# Patient Record
Sex: FEMALE | Race: BLACK OR AFRICAN AMERICAN | Employment: FULL TIME | URBAN - METROPOLITAN AREA
[De-identification: names, ages, dates, MRNs, and addresses within clinical notes are randomized per-mention and may not be internally consistent; named-entity substitution may affect disease eponyms.]

---

## 2018-01-17 ENCOUNTER — HOSPITAL ENCOUNTER (OUTPATIENT)
Dept: PHYSICAL THERAPY | Age: 23
Discharge: HOME | End: 2018-01-17
Payer: COMMERCIAL

## 2018-01-17 PROCEDURE — 97162 PT EVAL MOD COMPLEX 30 MIN: CPT

## 2018-01-17 PROCEDURE — 97110 THERAPEUTIC EXERCISES: CPT

## 2018-01-17 PROCEDURE — 97140 MANUAL THERAPY 1/> REGIONS: CPT

## 2018-01-17 PROCEDURE — 97161 PT EVAL LOW COMPLEX 20 MIN: CPT

## 2018-01-17 NOTE — PROGRESS NOTES
PHYSICAL THERAPY - DAILY TREATMENT NOTE    Patient Name: Dallas Amezquita        Date: 2018  : 1995   YES Patient  Verified  Visit #:     Insurance: Payor: Al Barker / Plan: [address] [place] [address] / Product Type: PPO /      In time: 11:34 am Out time: 12:22 pm   Total Treatment Time: 48     Medicare Time Tracking (below)   Total Timed Codes (min):  n/a 1:1 Treatment Time:  n/a     TREATMENT AREA =  Low back pain [M54.5]  Degeneration of lumbar intervertebral disc [M51.36]  SUBJECTIVE  Pain Level (on 0 to 10 scale):   10   Medication Changes/New allergies or changes in medical history, any new surgeries or procedures? NO    If yes, update Summary List   Subjective Functional Status/Changes:  []  No changes reported     See POC         OBJECTIVE  Modalities Rationale:     PD    9 min Therapeutic Exercise:  [x]  See flow sheet   Rationale:      increase ROM and increase strength to improve the patients ability to perform ADLs. 10 min Manual Therapy: Prone STM/DTM to MIKEY L/S erector spinae, sustained pressure to MIKEY QL, sustained pressure to MIKEY piriformis   Rationale:      decrease pain, increase ROM and increase tissue extensibility of L/S to improve patient's ability to tolerate prolonged standing. min Patient Education:  YES  Reviewed HEP   []  Progressed/Changed HEP based on: Other Objective/Functional Measures:   Physical Therapy Evaluation - Lumbar Spine ([facility])    SUBJECTIVE  Aggravated by:   [] Bending [] Sitting [x] Standing [] Walking   [] Moving [] Cough [] Sneeze [] Valsalva   [] AM  [] PM  Lying:  [x] sup   [] pro   [] sidelying   [x] Other: Prepping meals     Eased by:    [x] Bending [] Sitting [] Standing [] Walking   [] Moving [] AM  [] PM  Lying: [] sup  [] pro  [x] sidelying   [] Other: Semireclined     General Health:  Red Flags Indicated? [] Yes    [] No  [] Yes [x] No Recent weight change (If yes, due to dieting?  [] Yes  [] No)   [] Yes [x] No Weakness in legs during walking  [] Yes [x] No Unremitting pain at night  [] Yes [x] No Abdominal pain or problems  [] Yes [x] No Rectal bleeding  [] Yes [x] No Menstrual irregularities  [] Yes [x] No Blood or pain with urination  [] Yes [x] No Dysfunction of bowel or bladder  [] Yes [x] No Numbness/tingling in buttock/genitalia region    Past History/Treatments:     Diagnostic Tests: [] Lab work [x] X-rays    [] CT [] MRI     [] Other:  Results: L/S X-ray: L5 disc degeneration    OBJECTIVE  Posture:  Lateral Shift: [] R    [] L     [] +  [x] -  Kyphosis: [] Increased [] Decreased   [x]  WNL  Lordosis:  [x] Increased [] Decreased   [] WNL    Active Movements: [] N/A   [] Too acute   [] Other:  ROM % AROM % PROM Comments:pain, area   Forward flexion 40-60 85%     Extension 20-30 <25%  Incr LBP   SB right 20-30 100%     SB left 20-30 75%     Rotation right 5-10 100%     Rotation left 5-10 100%       Neuro Screen [] WNL  Myotome/Dermatome/Reflexes: L2-S2 MIKEY LE light touch sensation WNLs  Comments:    Dural Mobility:  SLR Supine: [] R    [] L    [] +    [x] -  @ (degrees):   Slump Test: [] R    [] L    [] +    [x] -  @ (degrees):   Prone Knee Bend: [] R    [] L    [] +    [x] -     Palpation  [] Min  [x] Mod  [] Severe    Location: L/S Paraspinals  [] Min  [x] Mod  [x] Severe    Location: Quadratus Lumborum (L>R)  [] Min  [x] Mod  [] Severe    Location: Piriformis    Strength   L(0-5) R (0-5) N/T   Psoas  (L1,2) 4 4+ []   Quadriceps (L3,4) 5- 4+ []   Ant Tibialis (L4) 4+ 4- []   Extensor Hallicus (L5) 4+ 5-    Gluteus Medius (L5) 3+ 3+ []   Gastrocnemius (S1, S2)   []   Hamstring (S1,2) 5 5 []   Gluteus Sid (S1, S2) 3+ 4- []   Supine Bridge ~50% incr LBP []     Special Tests    [place]:  Distraction: [x] R    [x] L    [x] +    [] -     Compression: [] +    [x] -     Leg Length: [x] +    [] -   Position: R Post     Mobility: Standing flex: R         Hip: Alberta Frannie:  [] R    [x] L    [x] +    [] - Piriformis: [x] R    [x] L    [x] +    [] -          Deficits: Olivia's: [x] R    [x] L    [x] +    [] -     Hamstrings 90/90:R150/L 146    Gastrocsoleus (to neutral): Right: neutral Left: neutral    Justification for Eval Complexity:   Patient History: MEDIUM  Complexity : 1-2 comorbidities / personal factors will impact the outcome/ POC  (Plantar fasciitis and BMI >30)  Examination:HIGH Complexity : 4+ Standardized tests and measures addressing body structure, function, activity limitation and / or participation in recreation  (See POC and musculoskeletal examination attached)  Clinical Presentation: LOW Complexity : Stable, uncomplicated  (pain level 5/10 on average and 10/10 @ worst, intermittent pain)  Clinical Decision Making:MEDIUM Complexity : FOTO score of 26-74 (Foto 58/100)  Overall Complexity:LOW      Post Treatment Pain Level (on 0 to 10) scale:   3  / 10     ASSESSMENT  Assessment/Changes in Function:     See POC     []  See Progress Note/Recertification   Patient will continue to benefit from skilled PT services to modify and progress therapeutic interventions, address functional mobility deficits, address ROM deficits, address strength deficits, analyze and address soft tissue restrictions, analyze and cue movement patterns, analyze and modify body mechanics/ergonomics and assess and modify postural abnormalities to attain remaining goals.    Progress toward goals / Updated goals:    See POC     PLAN  [x]  Upgrade activities as tolerated YES Continue plan of care   []  Discharge due to :    []  Other:      Therapist: Liudmila Price DPT     Date: 1/17/2018 Time: 11:06 AM     Future Appointments  Date Time Provider Richard Garcia   1/23/2018 4:30 PM [address]   1/30/2018 4:00 PM [facility]   2/6/2018 3:30 PM [facility]   2/13/2018 4:00 PM [facility]   2/20/2018 4:00 PM [facility]

## 2018-01-17 NOTE — PROGRESS NOTES
[facility] PHYSICAL THERAPY AT [facility], [address] [facility] [address] - Phone: [phone]  Fax: [phone] / [address] [address] PHYSICAL THERAPY SERVICES  Patient Name: Meggan Palu : 1995   Medical   Diagnosis: Low back pain [M54.5]  Degeneration of lumbar intervertebral disc [M51.36] Treatment Diagnosis: Low back pain  Degeneration of lumbar intervertebral dis   Onset Date: 2017     Referral Source: ARUNA Torres Start of Care [facility]): 2018   Prior Hospitalization: See medical history Provider #: [id number]   Prior Level of Function: Intermittent LBP    Comorbidities: MIKEY Plantar Fasciitis, >30 BMI    Medications: Verified on Patient Summary List   The Plan of Care and following information is based on the information from the initial evaluation.   ==================================================================================  Assessment / key information: Patient is a 20 y.o. female who presents to [facility] with diagnosis of Low back pain [M54.5]  Degeneration of lumbar intervertebral disc [M51.36]. 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 Pain is located in central low back and described as intermittent soreness. Pt denies numbness and tingling radiating down the MIKEY LEs. Pain level is rated at 3/10 at the best, 5/10 currently, and 10/10 at the worst. LBP increases with prolonged standing (1.5 hours) and prepping meals, decreases with massage chair and medication. Upon objective evaluation, patient demonstrates CPA hypermobility and TTP of L4/5, R SI hypomobility, impaired and painful trunk AROM in SB LE, decreased core and multifidus strength, and impaired flexibility of MIKEY piriformis and hamstring musculature.  L/S AROM is as follows: Flexion = 85%, Extension = <25%, R/L Sidebending = 100/75%, R/L Rot = 100%/100%. All L/S special testing and red flags were cleared and negative. Patient scored 62 on FOTO indicating decreased functional status and quality of life. Patient can benefit from skilled PT interventions to improve L/S ROM, flexibility, core strength, decrease pain and TTP and for education on posture, body mechanics and lifting mechanics/transfers to facilitate ADL's & overall functional status/quality of life.    ==================================================================================  Problem List: pain affecting function, decrease ROM, decrease strength, edema affecting function, impaired gait/ balance, decrease ADL/ functional abilities, decrease activity tolerance, decrease flexibility/ joint mobility and decrease transfer abilities   Treatment Plan may include any combination of the following: Therapeutic exercise, Therapeutic activities, Neuromuscular re-education, Physical agent/modality, dry needling, Gait/balance training, Manual therapy and Patient education  Patient / Family readiness to learn indicated by: asking questions, trying to perform skills and interest  Persons(s) to be included in education: patient (P)  Barriers to Learning/Limitations: no  Measures taken:    Patient Goal (s): \"no more pain\"   Patient self reported health status: fair  Rehabilitation Potential: good   Short Term Goals: To be accomplished in 2  weeks:  1) Establish HEP. 2) Patient will report decreased c/o pain to < or = 8/10 at the worst to facilitate prolonged standing with manageable sx in L/S.  3) Patient will report 25% improvement in ability to prep meals. 5) Patient to be independent in SI self correct for improved functional mobility.  Long Term Goals: To be accomplished in 6 weeks:  1) Patient independent with HEP and able to demo proper body mechanics for floor to chest lifting.   2) Patient will increase L/S ROM in L SB to >/=85% to increase ability to perform household chores. 3) Increase FOTO to 73 indicating improved function and quality of life. 4) Patient will increase MIKEY hip ABD strength to 4-/5 in order to improve ease with prolonged standing. 5) Patient to perform 85% bridge indicating improved core strength to improve ambulation around the grocery store. 6) Patient to increase standing tolerance to >/= to 3 hours in order to improve ease with prepping meals. Frequency / Duration:   Patient to be seen  2-3  times per week for 6-8  weeks:  Patient / Caregiver education and instruction: self care, activity modification and exercises  G-Codes (GP): n/a  Eval Complexity: History: MEDIUM  Complexity : 1-2 comorbidities / personal factors will impact the outcome/ POC Exam:HIGH Complexity : 4+ Standardized tests and measures addressing body structure, function, activity limitation and / or participation in recreation  Presentation: LOW Complexity : Stable, uncomplicated  Clinical Decision Making:MEDIUM Complexity : FOTO score of 26-74Overall Complexity:MEDIUM    Therapist Signature: Herminia Given Date: 0/60/9416   Certification Period: n/a Time: 11:27 AM   ==================================================================================  I certify that the above Physical Therapy Services are being furnished while the patient is under my care. I agree with the treatment plan and certify that this therapy is necessary. Physician Signature:        Date:       Time:     Please sign and return to [facility] or you may fax the signed copy to [phone]. Thank you.

## 2018-01-23 ENCOUNTER — HOSPITAL ENCOUNTER (OUTPATIENT)
Dept: PHYSICAL THERAPY | Age: 23
Discharge: HOME | End: 2018-01-23
Payer: COMMERCIAL

## 2018-01-23 PROCEDURE — 97110 THERAPEUTIC EXERCISES: CPT

## 2018-01-23 PROCEDURE — 97140 MANUAL THERAPY 1/> REGIONS: CPT

## 2018-01-23 NOTE — PROGRESS NOTES
PHYSICAL THERAPY - DAILY TREATMENT NOTE    Patient Name: Cj Cox        Date: 2018  : 1995   YES Patient  Verified  Visit #:   2   of   12  Insurance: Payor: Haosouleymane Sally / Plan: [address] [place] [address] / Product Type: PPO /      In time: 4:24pm Out time: 5:05 pm   Total Treatment Time: 41     Medicare Time Tracking (below)   Total Timed Codes (min):  n/a 1:1 Treatment Time:  n/a     TREATMENT AREA =  Low back pain [M54.5]  Degeneration of lumbar intervertebral disc [M51.36]  SUBJECTIVE  Pain Level (on 0 to 10 scale):  0  / 10   Medication Changes/New allergies or changes in medical history, any new surgeries or procedures? NO    If yes, update Summary List   Subjective Functional Status/Changes:  []  No changes reported     Pt states \"I had some earlier but now im fine\"         OBJECTIVE  Modalities Rationale:     PD    31 min Therapeutic Exercise:  [x]  See flow sheet   Rationale:      increase ROM and increase strength to improve the patients ability to perform ADLs. 10 min Manual Therapy: Prone STM/DTM to MIKEY L/S erector spinae, sustained pressure to MIKEY QL, sustained pressure to MIKEY piriformis, MET to correct R ant innominate. Rationale:      decrease pain, increase ROM, increase tissue extensibility and decrease trigger points in L/S to improve patient's ability to tolerate prolonged standing. min Patient Education:  YES  Reviewed HEP   []  Progressed/Changed HEP based on: Other Objective/Functional Measures: Added clam, abd draw, bridge, PPT, and piriformis str with good pt tolerance and no complaints of sx. Post Treatment Pain Level (on 0 to 10) scale:   0  / 10     ASSESSMENT  Assessment/Changes in Function:     Pt presented with increased TTP and mm tone in L piriformis. Presented with R an rotated innominate. Updated and issued current HEP, patient deferred questions.       []  See Progress Note/Recertification   Patient will continue to benefit from skilled PT services to modify and progress therapeutic interventions, address functional mobility deficits, address ROM deficits, address strength deficits, analyze and address soft tissue restrictions, analyze and cue movement patterns, analyze and modify body mechanics/ergonomics and assess and modify postural abnormalities to attain remaining goals. Progress toward goals / Updated goals:    First visit after initial evaluation. Progress tx per POC.         PLAN  [x]  Upgrade activities as tolerated YES Continue plan of care   []  Discharge due to :    []  Other:      Therapist: Guanaco Patel DPT     Date: 1/23/2018 Time: 9:29 AM     Future Appointments  Date Time Provider Richard Garcia   1/23/2018 4:30 PM [address]   1/30/2018 4:00 PM [facility]   2/6/2018 3:30 PM [facility]   2/13/2018 4:00 PM [facility]   2/20/2018 4:00 PM [facility]

## 2018-01-30 ENCOUNTER — HOSPITAL ENCOUNTER (OUTPATIENT)
Dept: PHYSICAL THERAPY | Age: 23
Discharge: HOME | End: 2018-01-30
Payer: COMMERCIAL

## 2018-01-30 PROCEDURE — 97140 MANUAL THERAPY 1/> REGIONS: CPT

## 2018-01-30 PROCEDURE — 97110 THERAPEUTIC EXERCISES: CPT

## 2018-01-30 NOTE — PROGRESS NOTES
PHYSICAL THERAPY - DAILY TREATMENT NOTE    Patient Name: Amanda Sensor        Date: 2018  : 1995   YES Patient  Verified  Visit #:   3   of   12  Insurance: Payor: Susan Grace / Plan: [address] [place] [address] / Product Type: PPO /      In time: 4:06pm Out time: 5:09pm   Total Treatment Time: 63     Medicare Time Tracking (below)   Total Timed Codes (min):  n/a 1:1 Treatment Time:  n/a     TREATMENT AREA =  Low back pain [M54.5]  Degeneration of lumbar intervertebral disc [M51.36]  SUBJECTIVE  Pain Level (on 0 to 10 scale):  6.5  / 10   Medication Changes/New allergies or changes in medical history, any new surgeries or procedures? NO    If yes, update Summary List   Subjective Functional Status/Changes:  []  No changes reported     Pt states \"my upper left back area is killing me\"         OBJECTIVE  Modalities Rationale:     decrease pain to improve patient's ability to perform functional ADLs   min [] Estim, type/location:                                      []  att     []  unatt     []  w/US     []  w/ice    []  w/heat    min []  Mechanical Traction: type/lbs                   []  pro   []  sup   []  int   []  cont    []  before manual    []  after manual    min []  Ultrasound, settings/location:      min []  Iontophoresis w/ dexamethasone, location:                                               []  take home patch       []  in clinic   10 min []  Ice     [x]  Heat    location/position: L/S in semireclined lying    min []  Vasopneumatic Device, press/temp:     min []  Other:    [x] Skin assessment post-treatment (if applicable):    [x]  intact    []  redness- no adverse reaction     []redness - adverse reaction:        42 min Therapeutic Exercise:  [x]  See flow sheet   Rationale:      increase ROM and increase strength to improve the patients ability to perform ADLs.      11 min Manual Therapy: Prone STM/DTM to MIKEY mid T/S erector spinae, sustained pressure to L QL, sustained pressure to MIKEY piriformis   Rationale:      decrease pain, increase ROM, increase tissue extensibility and decrease trigger points in L/S to improve patient's ability to tolerate prolonged standing. min Patient Education:  YES  Reviewed HEP   []  Progressed/Changed HEP based on: Other Objective/Functional Measures: Added bridge and prone hip extension with good pt tolerance and no complaints of sx. Post Treatment Pain Level (on 0 to 10) scale:   2  / 10     ASSESSMENT  Assessment/Changes in Function:     Pt presented with increased TTP and mm tone in L QL and mid thoracic paraspinals. Demonstrates ~25% supine bridge indicated decrease core and L/S strength. Improved tolerance for abd drawing without increases in L/S pain. []  See Progress Note/Recertification   Patient will continue to benefit from skilled PT services to modify and progress therapeutic interventions, address functional mobility deficits, address ROM deficits, address strength deficits, analyze and address soft tissue restrictions, analyze and cue movement patterns, analyze and modify body mechanics/ergonomics and assess and modify postural abnormalities to attain remaining goals. Progress toward goals / Updated goals:    Progressing towards STG 1.       PLAN  [x]  Upgrade activities as tolerated YES Continue plan of care   []  Discharge due to :    []  Other:      Therapist: Carmencita Hernández DPT     Date: 1/30/2018 Time: 8:37 AM     Future Appointments  Date Time Provider Richard Garcia   1/30/2018 4:00 PM Gabrielle Oconnell [facility]   2/13/2018 4:00 PM Maureen Katz PTA [facility]   2/20/2018 4:00 PM Nora Puri [facility]

## 2018-02-06 ENCOUNTER — APPOINTMENT (OUTPATIENT)
Dept: PHYSICAL THERAPY | Age: 23
End: 2018-02-06
Payer: COMMERCIAL

## 2018-02-13 ENCOUNTER — HOSPITAL ENCOUNTER (OUTPATIENT)
Dept: PHYSICAL THERAPY | Age: 23
Discharge: HOME | End: 2018-02-13
Payer: COMMERCIAL

## 2018-02-13 PROCEDURE — 97110 THERAPEUTIC EXERCISES: CPT

## 2018-02-13 PROCEDURE — 97140 MANUAL THERAPY 1/> REGIONS: CPT

## 2018-02-13 NOTE — PROGRESS NOTES
PHYSICAL THERAPY - DAILY TREATMENT NOTE    Patient Name: Nadeen December        Date: 2018  : 1995   YES Patient  Verified  Visit #:    Insurance: Payor: Maida Wei / Plan: [address] [place] [address] / Product Type: PPO /      In time: 3:48 pm Out time: 4:43 pm   Total Treatment Time: 55         TREATMENT AREA =  Low back pain [M54.5]  Degeneration of lumbar intervertebral disc [M51.36]    SUBJECTIVE  Pain Level (on 0 to 10 scale):  7  / 10- middle low back   Medication Changes/New allergies or changes in medical history, any new surgeries or procedures? NO    If yes, update Summary List   Subjective Functional Status/Changes:  []  No changes reported     Pt reports she missed therapy secondary to being sick. 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 Increased pain since then. Pt reports ~ 30 % improvement in standing tolerance. Pt reports standing tolerance ~ 2 hours.        OBJECTIVE  Modalities Rationale:     decrease edema, decrease inflammation, decrease pain and increase tissue extensibility to improve patient's ability to perform prolonged standing   min [] Estim, type/location:                                      []  att     []  unatt     []  w/US     []  w/ice    []  w/heat    min []  Mechanical Traction: type/lbs                   []  pro   []  sup   []  int   []  cont    []  before manual    []  after manual    min []  Ultrasound, settings/location:      min []  Iontophoresis w/ dexamethasone, location:                                               []  take home patch       []  in clinic   10 min [x]  Ice     []  Heat    location/position: Prone low back /left post hip    min []  Vasopneumatic Device, press/temp:     min []  Other:    [x] Skin assessment post-treatment (if applicable):    [x]  intact    []  redness- no adverse reaction     []redness  adverse reaction:        33 min Therapeutic Exercise:  [x]  See flow sheet   Rationale:      increase ROM and increase strength to improve the patients ability to perform prolonged standing    12 min Manual Therapy: Prone TrPt release to Left piriformis/glut medius/post hip; pt instruction in self correction of R ant innominate with MET and self massage techniques   Rationale:      decrease pain, increase ROM, increase tissue extensibility and decrease trigger points to improve patient's ability to improve tissue mobility in bending and prolonged standing        thorughout treatment min Patient Education:  YES  Reviewed HEP   []  Progressed/Changed HEP based on: Other Objective/Functional Measures: Add supine hip flexion with abd draw, hold prone hip extension secondary to fatigue/pain   Post Treatment Pain Level (on 0 to 10) scale:  2.5-3   / 10     ASSESSMENT  Assessment/Changes in Function:   Pt demonstrating slow progress in SI mobility. Moderate trigger point tenderness to L piriformis /glut medius. Pt demonstrating good pain relief after session. Pt education in self SI correction and log roll technique. []  See Progress Note/Recertification   Patient will continue to benefit from skilled PT services to modify and progress therapeutic interventions, address functional mobility deficits, address ROM deficits, address strength deficits, analyze and address soft tissue restrictions and instruct in home and community integration to attain remaining goals. Progress toward goals / Updated goals:  1) Establish HEP. -currently in progress. 2) Patient will report decreased c/o pain to < or = 8/10 at the worst to facilitate prolonged standing with manageable sx in L/S.-partially met  3) Patient will report 25% improvement in ability to prep meals. met goal 2-13-18 ~ 30%   5) Patient to be independent in SI self correct for improved functional mobility.  -currently in progress        PLAN  []  Upgrade activities as tolerated YES Continue plan of care   []  Discharge due to :    []  Other:      Therapist: Lili Bates, PTA    Date: 2/13/2018 Time: 4:43  PM     Future Appointments  Date Time Provider Richard Garcia   2/13/2018 4:00 PM Delilah Rodríguez [facility]   2/20/2018 4:00 PM Lacie Romeo [facility]

## 2018-02-20 ENCOUNTER — HOSPITAL ENCOUNTER (OUTPATIENT)
Dept: PHYSICAL THERAPY | Age: 23
Discharge: HOME | End: 2018-02-20
Payer: COMMERCIAL

## 2018-02-20 PROCEDURE — 97110 THERAPEUTIC EXERCISES: CPT

## 2018-02-20 NOTE — PROGRESS NOTES
[facility] PHYSICAL THERAPY AT [facility] [address], [address] [facility], [place], [facility] [address]  Phone: [phone]  Fax: [phone] SUMMARY  Patient Name: Sneha Moffett : 1995   Treatment/Medical Diagnosis: Low back pain [M54.5]  Degeneration of lumbar intervertebral disc [M51.36]   Referral Source: ARUNA Desai     Date of Initial Visit: 18 Attended Visits: 5 Missed Visits: 1     SUMMARY OF TREATMENT  Therapeutic exercises including ROM, strengthening, stretching, manual therapy including joint and soft tissue manipulation, balance training, postural re-education, modalities: ice, HEP instruction. 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 Pt reports 75% improvement in back sx since initiating PT services. Based on progress from PT services and pt reported improvement, patient is appropriate for DC to home mgt of sx at this time. Goal/Measure of Progress Goal Met? 1. Patient to perform 85% bridge indicating improved core strength to improve ambulation around the grocery store   Status at last Eval: Bridge = 50% incr LBP Current Status: Bridge = 85% yes   2. Patient will report decreased c/o pain to < or = 8/10 at the worst to facilitate prolonged standing with manageable sx in L/S. Status at last Eval: 1010 @ worst Current Status: 6-7/10 @ worst yes   3. Patient will increase L/S ROM in L SB to >/=85% to increase ability to perform household chores.     Status at last Eval: L Sidebending = 75%   Current Status: L Side bending = 100% yes   4. Increase FOTO to 73 indicating improved function and quality of life. Status at last Eval: FOTO = 58 Current Status: FOTO = 76 yes   5. Patient will increase MIKEY hip ABD strength to 4-/5 in order to improve ease with prolonged standing.   MIKEY Hip ABD = 3+/5 Current Status: R/L Hip ABD = 4-/4 yes   6. Patient to increase standing tolerance to >/= to 3 hours in order to improve ease with prepping meals 1.5 hour standing tolerance Current Status: 2 hour standing tolerance Progressing     RECOMMENDATIONS  Discontinue therapy. Progressing towards or have reached established goals. If you have any questions/comments please contact us directly at [phone]. Thank you for allowing us to assist in the care of your patient.     Therapist Signature: Ricardo Brewster Date: 2/20/2018     Time: 3:47 PM

## 2018-02-20 NOTE — PROGRESS NOTES
PHYSICAL THERAPY - DAILY TREATMENT NOTE    Patient Name: Jose Luis Brandt        Date: 2018  : 1995   YES Patient  Verified  Visit #:      of   12  Insurance: Payor: Lorena Hendrickson / Plan: [address] [place] [address] / Product Type: PPO /      In time: 3:46pm Out time: 4:18pm   Total Treatment Time: 31     Medicare Time Tracking (below)   Total Timed Codes (min):  n/a 1:1 Treatment Time:  n/a     TREATMENT AREA =  Low back pain [M54.5]  Degeneration of lumbar intervertebral disc [M51.36]    SUBJECTIVE  Pain Level (on 0 to 10 scale):  0  / 10   Medication Changes/New allergies or changes in medical history, any new surgeries or procedures? NO    If yes, update Summary List   Subjective Functional Status/Changes:  []  No changes reported     Patient states \"75% improvement in back since starting therapy\"          OBJECTIVE  Modalities Rationale:    n/a    31 min Therapeutic Exercise:  [x]  See flow sheet   Rationale:      increase ROM and increase strength to improve the patients ability to perform ADLs. min Patient Education:  YES  Reviewed HEP   []  Progressed/Changed HEP based on: Other Objective/Functional Measures:    FOTO = 76     Post Treatment Pain Level (on 0 to 10) scale:   0  / 10     ASSESSMENT  Assessment/Changes in Function:     See PN.     []  See Progress Note/Recertification   Patient will continue to benefit from skilled PT services to modify and progress therapeutic interventions, address functional mobility deficits, address ROM deficits, address strength deficits, analyze and address soft tissue restrictions, analyze and cue movement patterns, analyze and modify body mechanics/ergonomics, assess and modify postural abnormalities, address imbalance/dizziness and instruct in home and community integration to attain remaining goals. Progress toward goals / Updated goals:    See PN.       PLAN  []  Upgrade activities as tolerated YES Continue plan of care   []  Discharge due to :    []  Other:      Therapist: Rosalia Fuller    Date: 2/20/2018 Time: 1:33 PM     Future Appointments  Date Time Provider Richard Garcia   2/20/2018 4:00 PM Rosalia Fuller [facility]

## 2018-02-27 ENCOUNTER — APPOINTMENT (OUTPATIENT)
Dept: PHYSICAL THERAPY | Age: 23
End: 2018-02-27
Payer: COMMERCIAL

## 2018-04-23 ENCOUNTER — HOSPITAL ENCOUNTER (OUTPATIENT)
Dept: PHYSICAL THERAPY | Age: 23
Discharge: HOME | End: 2018-04-23
Payer: COMMERCIAL

## 2018-04-23 PROCEDURE — 97110 THERAPEUTIC EXERCISES: CPT

## 2018-04-23 PROCEDURE — 97014 ELECTRIC STIMULATION THERAPY: CPT

## 2018-04-23 PROCEDURE — 97161 PT EVAL LOW COMPLEX 20 MIN: CPT
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
## 2018-04-25 ENCOUNTER — HOSPITAL ENCOUNTER (OUTPATIENT)
Dept: PHYSICAL THERAPY | Age: 23
Discharge: HOME | End: 2018-04-25
Payer: COMMERCIAL

## 2018-04-25 PROCEDURE — 97014 ELECTRIC STIMULATION THERAPY: CPT

## 2018-04-25 PROCEDURE — 97110 THERAPEUTIC EXERCISES: CPT

## 2018-04-25 NOTE — PROGRESS NOTES
PHYSICAL THERAPY - DAILY TREATMENT NOTE    Patient Name: Daina Ventura        Date: 2018  : 1995   YES Patient  Verified  Visit #:   2     Insurance: Payor: Stanley Diaz / Plan: [address] [place] [address] / Product Type: PPO /      In time: 9:40 am Out time: 10:30 am   Total Treatment Time: 50     TREATMENT AREA =  Low back pain [M54.5]    SUBJECTIVE  Pain Level (on 0 to 10 scale):  6  / 10   Medication Changes/New allergies or changes in medical history, any new surgeries or procedures? NO    If yes, update Summary List   Subjective Functional Status/Changes:  []  No changes reported   Pt reports low back pain is sometimes constant and sometimes it comes and goes.           OBJECTIVE  Modalities Rationale:     decrease edema, decrease inflammation, decrease pain and increase tissue extensibility to improve patient's ability to perform prolonged standing  15 min [x] Estim, type/location: IFC low back                                     []  att     [x]  unatt     []  w/US     []  w/ice    [x]  w/heat    min []  Mechanical Traction: type/lbs                   []  pro   []  sup   []  int   []  cont    []  before manual    []  after manual    min []  Ultrasound, settings/location:      min []  Iontophoresis w/ dexamethasone, location:                                               []  take home patch       []  in clinic    min []  Ice     []  Heat    location/position:     min []  Vasopneumatic Device, press/temp:     min []  Other:    [x] Skin assessment post-treatment (if applicable):    [x]  intact    []  redness- no adverse reaction     []redness  adverse reaction:        45 min Therapeutic Exercise:  [x]  See flow sheet   Rationale:      increase ROM and increase strength to improve the patients ability to  perform prolonged standing     min Patient Education:  YES  Reviewed HEP   [x]  Progressed/Changed HEP based on:  Good tolerance to exercise     Other Objective/Functional Measures:    Review HEP, add rec bike. Trial Upright bike-changed to rec bike. , abd draw, dead bug, hip ABD/ADD, LTR     Post Treatment Pain Level (on 0 to 10) scale:   2 / 10     ASSESSMENT  Assessment/Changes in Function:     Pt demonstrating good pain relief with use of door way hip flexion  Stretch. Mild muscle guarding with position changes. Updated written HEP. Pt verbalized good understanding of HEP. []  See Progress Note/Recertification   Patient will continue to benefit from skilled PT services to modify and progress therapeutic interventions, address functional mobility deficits, address ROM deficits, address strength deficits and instruct in home and community integration to attain remaining goals. Progress toward goals / Updated goals:    First visit from initial evaluation. Progressed treatment per plan of care     PLAN  []  Upgrade activities as tolerated YES Continue plan of care   []  Discharge due to :    []  Other:      Therapist: Nahun Knight PTA    Date: 4/25/2018 Time: 10:30 AM     No future appointments.
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
## 2018-05-01 ENCOUNTER — HOSPITAL ENCOUNTER (OUTPATIENT)
Dept: PHYSICAL THERAPY | Age: 23
Discharge: HOME | End: 2018-05-01
Payer: COMMERCIAL

## 2018-05-01 PROCEDURE — 97140 MANUAL THERAPY 1/> REGIONS: CPT

## 2018-05-01 PROCEDURE — 97014 ELECTRIC STIMULATION THERAPY: CPT

## 2018-05-01 PROCEDURE — 97110 THERAPEUTIC EXERCISES: CPT

## 2018-05-02 ENCOUNTER — APPOINTMENT (OUTPATIENT)
Dept: PHYSICAL THERAPY | Age: 23
End: 2018-05-02
Payer: COMMERCIAL

## 2018-05-09 ENCOUNTER — HOSPITAL ENCOUNTER (OUTPATIENT)
Dept: PHYSICAL THERAPY | Age: 23
End: 2018-05-09
Payer: COMMERCIAL

## 2018-05-10 ENCOUNTER — HOSPITAL ENCOUNTER (OUTPATIENT)
Dept: PHYSICAL THERAPY | Age: 23
Discharge: HOME | End: 2018-05-10
Payer: COMMERCIAL

## 2018-05-10 PROCEDURE — 97110 THERAPEUTIC EXERCISES: CPT

## 2018-05-10 PROCEDURE — 97140 MANUAL THERAPY 1/> REGIONS: CPT

## 2018-05-10 PROCEDURE — 97014 ELECTRIC STIMULATION THERAPY: CPT

## 2018-05-10 NOTE — PROGRESS NOTES
PHYSICAL THERAPY - DAILY TREATMENT NOTE    Patient Name: Frankie Davenport        Date: 2018  : 1995   YES Patient  Verified  Visit #:   4     Insurance: Payor: Sarika Barron / Plan: [address] [place] [address] / Product Type: PPO /      In time: 745 Out time: 840   Total Treatment Time: 55     Medicare Time Tracking (below)   Total Timed Codes (min):  n/a 1:1 Treatment Time:  n/a     TREATMENT AREA =  Low back pain [M54.5]  SUBJECTIVE  Pain Level (on 0 to 10 scale):  5  / 10   Medication Changes/New allergies or changes in medical history, any new surgeries or procedures? NO    If yes, update Summary List   Subjective Functional Status/Changes:  []  No changes reported     \"I am doing the exercises and watching my posture like you said but its hard bc I work a lot. I dont stand a lot at work anymore which has helped. \"         OBJECTIVE  Modalities Rationale:     decrease pain to improve patient's ability to perform functional ADLs  15 min [x] Estim, type/location: [facility] low back                                      []  att     [x]  unatt     []  w/US     []  w/ice    [x]  w/heat    min []  Mechanical Traction: type/lbs                   []  pro   []  sup   []  int   []  cont    []  before manual    []  after manual    min []  Ultrasound, settings/location:      min []  Iontophoresis w/ dexamethasone, location:                                               []  take home patch       []  in clinic    min []  Ice     []  Heat    location/position:     min []  Vasopneumatic Device, press/temp:     min []  Other:    [x] Skin assessment post-treatment (if applicable):    [x]  intact    []  redness- no adverse reaction     []redness  adverse reaction:        32 min Therapeutic Exercise:  [x]  See flow sheet   Rationale:      increase ROM and increase strength to improve the patients ability to perform ADLs.      8 min Manual Therapy: IASTM to bilateral erector spinae and piriformis Patient is without contraindications at this time. Patient was instructed in the indications/contraindications of cupping technique. The patient was educated in its purpose and risks/benefits. Patient was advised that redness is normal after technique is performed and that redness will disappear typically within one week. Dynamic cupping technique performed for 8min. Patient with verbalized understanding of all and without any adverse reaction after treatment. Rationale:      decrease pain, increase ROM, increase tissue extensibility and decrease trigger points to improve patient's ability to tolerate prolonged standing. min Patient Education:  YES  Reviewed HEP   []  Progressed/Changed HEP based on: Other Objective/Functional Measures: Added bilateral Sergo stretch off mat      Post Treatment Pain Level (on 0 to 10) scale:   0  / 10     ASSESSMENT  Assessment/Changes in Function:   Pt continues to require manual cueing to assist with obtaining TA contraction for therex      []  See Progress Note/Recertification   Patient will continue to benefit from skilled PT services to modify and progress therapeutic interventions, address functional mobility deficits, address ROM deficits, address strength deficits, analyze and address soft tissue restrictions, analyze and cue movement patterns, analyze and modify body mechanics/ergonomics and assess and modify postural abnormalities to attain remaining goals. Progress toward goals / Updated goals: · Short Term Goals: To be accomplished in  2  treatments:  1. Pt will be compliant with HEP for symptom management at home. -PROGRESSING 5/10     · Long Term Goals: To be accomplished in  8-12  treatments:  1. Pt will demonstrate an increased FOTO score to 86/100 in order to improve function  2. Pt will be independent with HEP at D/C for self management.   3. Pt will demonstrate correct form with a squat to the floor withou c/o LBP in order to lift objects off the floor at home without difficulty  4. Pt will be able to stand > 15min at any one time without LBP in order to perform work related duties with improved ease  5. Pt will be able to lay supine on mat without LBP in order to sleep better at night  6.  Pt will demonstrate increased L-S standing extension to >10 degrees AROM in order to perform static standing activities at work without pain       PLAN  [x]  Upgrade activities as tolerated YES Continue plan of care   []  Discharge due to :    []  Other:      Therapist: Wanda Barba PT, DPT    Date: 5/1/2018 Time: 1:28 PM     Future Appointments  Date Time Provider Richard Garcia   5/11/2018 7:30 AM [facility] PT [facility]   5/15/2018 7:30 AM [address]   5/16/2018 2:00 PM [address]

## 2018-05-11 ENCOUNTER — HOSPITAL ENCOUNTER (OUTPATIENT)
Dept: PHYSICAL THERAPY | Age: 23
Discharge: HOME | End: 2018-05-11
Payer: COMMERCIAL

## 2018-05-11 PROCEDURE — 97140 MANUAL THERAPY 1/> REGIONS: CPT

## 2018-05-11 PROCEDURE — 97014 ELECTRIC STIMULATION THERAPY: CPT

## 2018-05-11 PROCEDURE — 97110 THERAPEUTIC EXERCISES: CPT

## 2018-05-11 NOTE — PROGRESS NOTES
PHYSICAL THERAPY - DAILY TREATMENT NOTE    Patient Name: Abbey Urena        Date: 2018  : 1995   YES Patient  Verified  Visit #:     Insurance: Payor: Shelby Levels / Plan: [address] [place] [address] / Product Type: PPO /      In time: 730 Out time: 830   Total Treatment Time: 60     Medicare Time Tracking (below)   Total Timed Codes (min):  n/a 1:1 Treatment Time:  n/a     TREATMENT AREA =  Low back pain [M54.5]  SUBJECTIVE  Pain Level (on 0 to 10 scale):  2  / 10   Medication Changes/New allergies or changes in medical history, any new surgeries or procedures? NO    If yes, update Summary List   Subjective Functional Status/Changes:  []  No changes reported     \"I am getting new sneakers like you said and watching my body mechanics. \"         OBJECTIVE  Modalities Rationale:     decrease pain to improve patient's ability to perform functional ADLs  10 min [x] Estim, type/location: [facility] low back                                      []  att     [x]  unatt     []  w/US     []  w/ice    [x]  w/heat    min []  Mechanical Traction: type/lbs                   []  pro   []  sup   []  int   []  cont    []  before manual    []  after manual    min []  Ultrasound, settings/location:      min []  Iontophoresis w/ dexamethasone, location:                                               []  take home patch       []  in clinic    min []  Ice     []  Heat    location/position:     min []  Vasopneumatic Device, press/temp:     min []  Other:    [x] Skin assessment post-treatment (if applicable):    [x]  intact    []  redness- no adverse reaction     []redness  adverse reaction:        35 min Therapeutic Exercise:  [x]  See flow sheet   Rationale:      increase ROM and increase strength to improve the patients ability to perform ADLs.      15 min Manual Therapy: STM bilateral L-S and bilateral piriformis; grade II PA mobes L-S to tolerance   Rationale:      decrease pain, increase ROM, increase tissue extensibility and decrease trigger points to improve patient's ability to tolerate prolonged standing. min Patient Education:  YES  Reviewed HEP   []  Progressed/Changed HEP based on: Other Objective/Functional Measures: Added standing SB hip flexor stretch, cat/camel, and QL stretches on stairs to tolerance      Post Treatment Pain Level (on 0 to 10) scale:   0  / 10     ASSESSMENT  Assessment/Changes in Function:   Pt continues to require manual cueing to assist with obtaining TA contraction for therex. + Difficulty with cat portion of cat camel secondary to poor TA contraction. Able to perform with manual visual and VCs to decrease pain. []  See Progress Note/Recertification   Patient will continue to benefit from skilled PT services to modify and progress therapeutic interventions, address functional mobility deficits, address ROM deficits, address strength deficits, analyze and address soft tissue restrictions, analyze and cue movement patterns, analyze and modify body mechanics/ergonomics and assess and modify postural abnormalities to attain remaining goals. Progress toward goals / Updated goals: · Short Term Goals: To be accomplished in  2  treatments:  1. Pt will be compliant with HEP for symptom management at home. -PROGRESSING 5/10     · Long Term Goals: To be accomplished in  8-12  treatments:  1. Pt will demonstrate an increased FOTO score to 86/100 in order to improve function  2. Pt will be independent with HEP at D/C for self management. -ADMINISTERED ADDITIONAL EXERCISES 5/11  3. Pt will demonstrate correct form with a squat to the floor withou c/o LBP in order to lift objects off the floor at home without difficulty-PROGRESSING 5/11  4. Pt will be able to stand > 15min at any one time without LBP in order to perform work related duties with improved ease  5. Pt will be able to lay supine on mat without LBP in order to sleep better at night  6.  Pt will demonstrate increased L-S standing extension to >10 degrees AROM in order to perform static standing activities at work without pain       PLAN  [x]  Upgrade activities as tolerated YES Continue plan of care   []  Discharge due to :    []  Other:      Therapist: Erika Blackwood PT, DPT    Date: 5/1/2018 Time: 1:28 PM     Future Appointments  Date Time Provider Richard Garcia   5/15/2018 7:30 AM [facility] [address] [place] [address]   5/16/2018 2:00 PM Rachael Albright [facility]

## 2018-05-31 NOTE — PROGRESS NOTES
[address]  [facility] PHYSICAL THERAPY AT [address], [address], [address] - Phone: [phone]  Fax: [phone] SUMMARY  Patient Name: Almarie Kawasaki : 1995   Treatment/Medical Diagnosis: Low back pain [M54.5]   Referral Source: Stefanie Jackson MD     Date of Initial Visit: 18 Attended Visits: 5 Missed Visits: 3     SUMMARY OF TREATMENT  Physical Therapy Treatment has consisted of Therapeutic exercise for lumbar range of motion and strengthening, HEP, Manual Therapy, Interferential electrical stimulation, and moist heat. CURRENT STATUS  Pt was unable to be formally reassessed secondary to not returning to PT after 18 appointment. Goal/Measure of Progress Goal Met? 1. Pt will be compliant with HEP for symptom management at home.      Status at last Eval: dependent Current Status: Pt instructed in initial HEP. Partially met     RECOMMENDATIONS  Discontinue therapy due to lack of attendance or compliance. If you have any questions/comments please contact us directly at [phone]. Thank you for allowing us to assist in the care of your patient.     LPTA Signature: Jocelyn Galarza Date: 18   Therapist Signature: Andrew Valenzuela DPT Time: 7:34 PM